# Patient Record
Sex: FEMALE | Race: OTHER | Employment: UNEMPLOYED | URBAN - METROPOLITAN AREA
[De-identification: names, ages, dates, MRNs, and addresses within clinical notes are randomized per-mention and may not be internally consistent; named-entity substitution may affect disease eponyms.]

---

## 2019-06-04 ENCOUNTER — HOSPITAL ENCOUNTER (OUTPATIENT)
Dept: PHYSICAL THERAPY | Age: 59
End: 2019-06-04

## 2023-10-18 ENCOUNTER — OFFICE VISIT (OUTPATIENT)
Dept: NEUROLOGY | Age: 63
End: 2023-10-18
Payer: COMMERCIAL

## 2023-10-18 VITALS
SYSTOLIC BLOOD PRESSURE: 144 MMHG | WEIGHT: 177 LBS | DIASTOLIC BLOOD PRESSURE: 82 MMHG | OXYGEN SATURATION: 96 % | HEART RATE: 72 BPM | BODY MASS INDEX: 32.37 KG/M2

## 2023-10-18 DIAGNOSIS — G44.86 CERVICOGENIC HEADACHE: ICD-10-CM

## 2023-10-18 DIAGNOSIS — R40.0 DAYTIME SOMNOLENCE: ICD-10-CM

## 2023-10-18 DIAGNOSIS — G47.33 OBSTRUCTIVE SLEEP APNEA: ICD-10-CM

## 2023-10-18 DIAGNOSIS — M62.89 MUSCLE STIFFNESS: ICD-10-CM

## 2023-10-18 DIAGNOSIS — G31.84 MILD COGNITIVE IMPAIRMENT WITH MEMORY LOSS: Primary | ICD-10-CM

## 2023-10-18 PROBLEM — G47.10 DAYTIME HYPERSOMNIA: Status: ACTIVE | Noted: 2017-08-15

## 2023-10-18 PROBLEM — E66.9 OBESITY WITH BODY MASS INDEX 30 OR GREATER: Status: ACTIVE | Noted: 2017-11-02

## 2023-10-18 PROBLEM — R06.83 SNORING: Status: ACTIVE | Noted: 2017-11-15

## 2023-10-18 PROBLEM — I10 ESSENTIAL HYPERTENSION: Status: ACTIVE | Noted: 2019-05-09

## 2023-10-18 PROBLEM — R55 SYNCOPE: Status: ACTIVE | Noted: 2023-08-25

## 2023-10-18 PROBLEM — I10 BENIGN ESSENTIAL HTN: Status: ACTIVE | Noted: 2020-02-19

## 2023-10-18 PROBLEM — E66.9 OBESITY (BMI 30-39.9): Status: ACTIVE | Noted: 2017-11-15
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
## 2023-10-18 PROBLEM — M54.2 NECK PAIN: Status: ACTIVE | Noted: 2017-10-29

## 2023-10-18 PROBLEM — F51.01 PRIMARY INSOMNIA: Status: ACTIVE | Noted: 2023-10-18

## 2023-10-18 PROCEDURE — 3079F DIAST BP 80-89 MM HG: CPT | Performed by: PSYCHIATRY & NEUROLOGY

## 2023-10-18 PROCEDURE — 99205 OFFICE O/P NEW HI 60 MIN: CPT | Performed by: PSYCHIATRY & NEUROLOGY

## 2023-10-18 PROCEDURE — 3077F SYST BP >= 140 MM HG: CPT | Performed by: PSYCHIATRY & NEUROLOGY

## 2023-10-18 RX ORDER — DONEPEZIL HYDROCHLORIDE 5 MG/1
5 TABLET, ORALLY DISINTEGRATING ORAL NIGHTLY
Qty: 90 TABLET | Refills: 1 | Status: SHIPPED | OUTPATIENT
Start: 2023-10-18 | End: 2024-04-15

## 2023-10-18 RX ORDER — LOSARTAN POTASSIUM 100 MG/1
100 TABLET ORAL DAILY
COMMUNITY

## 2023-10-18 RX ORDER — CELECOXIB 200 MG/1
200 CAPSULE ORAL PRN
COMMUNITY
Start: 2022-12-05

## 2023-10-18 RX ORDER — MECLIZINE HYDROCHLORIDE 25 MG/1
25 TABLET ORAL 3 TIMES DAILY PRN
COMMUNITY
Start: 2023-07-20

## 2023-10-18 RX ORDER — LIDOCAINE 50 MG/G
1 PATCH TOPICAL DAILY
COMMUNITY
Start: 2023-07-20

## 2023-10-18 RX ORDER — ZOLPIDEM TARTRATE 5 MG/1
TABLET ORAL
COMMUNITY
End: 2023-10-18

## 2023-10-18 RX ORDER — LEVOTHYROXINE SODIUM 88 UG/1
88 TABLET ORAL DAILY
COMMUNITY

## 2023-10-18 ASSESSMENT — PATIENT HEALTH QUESTIONNAIRE - PHQ9
1. LITTLE INTEREST OR PLEASURE IN DOING THINGS: 0
SUM OF ALL RESPONSES TO PHQ QUESTIONS 1-9: 0
SUM OF ALL RESPONSES TO PHQ9 QUESTIONS 1 & 2: 0
SUM OF ALL RESPONSES TO PHQ QUESTIONS 1-9: 0
SUM OF ALL RESPONSES TO PHQ QUESTIONS 1-9: 0
2. FEELING DOWN, DEPRESSED OR HOPELESS: 0
SUM OF ALL RESPONSES TO PHQ QUESTIONS 1-9: 0

## 2023-10-18 ASSESSMENT — ENCOUNTER SYMPTOMS
TROUBLE SWALLOWING: 0
ABDOMINAL PAIN: 0
SHORTNESS OF BREATH: 0
SORE THROAT: 0
COUGH: 0
NAUSEA: 0
DIARRHEA: 0
EYE PAIN: 0

## 2023-10-18 NOTE — PROGRESS NOTES
[facility] NEUROLOGY NOTE    Patient: Chanel Johnston  Physician: Ezzie Aschoff, MD    CC: referred for memory loss  PCP: SCHUYLER Santamaria NP    History of Present Illness:     Chanel Johnston is a 57 y.o. right-handed [place]er speaking female with PMH of ROSELIA, overweight, hypertension, history of syncopal events, presents for evaluation of memory difficulties and tension type headaches. An audio-visual interface with a certified  was used today for the entire visit. On 7/20/2023, patient had a fall after tripping a and hit the right side of her head in the front on the ground. She points to her periorbital/temporal region. Complains of residual tenderness around this side of her head and neck on the right side. Reports no triggers however has spontaneous tension type headaches, complains of right neck and occipital pain more than left. On exam she has tenderness and muscle stiffness in both occipital regions and both trapezius muscles are very stiff. CT head results reviewed from [facility], no evidence of acute intracranial abnormality or hemorrhage. CT cervical spine did not show fractures or evidence of cervical stenosis per report. Her most concerning complaint today is memory difficulty. 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 These are people she has met several times and does not recognize the face when they go into hug her or say hello. She endorses memory loss for several years before this recent fall. She reports having done MMSE testing in 2012, 2015 and approximately 2 months ago by doctors at [facility]. Does not recall the results. Denies significant mood changes, hallucinations, does not get lost.  Sometimes forgets where she leaves objects in the house. She tells her family members \"Don't tell me to put this away, or I will never find it again\".   Also complains of losing track of her

## 2023-10-20 ENCOUNTER — TELEPHONE (OUTPATIENT)
Dept: NEUROLOGY | Age: 63
End: 2023-10-20

## 2023-10-20 NOTE — TELEPHONE ENCOUNTER
Xuan with [facility] [place] called asking if the split sleep study for Jad Mason MRN: [id number] can be changed to an In Home study?      Thank you  Sahara Morris

## 2023-10-23 ENCOUNTER — TELEPHONE (OUTPATIENT)
Dept: NEUROLOGY | Age: 63
End: 2023-10-23

## 2023-10-23 NOTE — TELEPHONE ENCOUNTER
Ludmila Castle called stating the patient needs a peer to peer for her sleep study, need to speak with Satish Range for this. Number to contact for this is [phone].

## 2024-03-22 ENCOUNTER — OFFICE VISIT (OUTPATIENT)
Dept: ORTHOPEDIC SURGERY | Age: 64
End: 2024-03-22

## 2024-03-22 VITALS — BODY MASS INDEX: 32.57 KG/M2 | WEIGHT: 177 LBS | HEIGHT: 62 IN

## 2024-03-22 DIAGNOSIS — M51.36 DDD (DEGENERATIVE DISC DISEASE), LUMBAR: ICD-10-CM

## 2024-03-22 DIAGNOSIS — M47.816 LUMBAR SPONDYLOSIS: Primary | ICD-10-CM

## 2024-03-22 RX ORDER — OXYBUTYNIN CHLORIDE 10 MG/1
10 TABLET, EXTENDED RELEASE ORAL DAILY
COMMUNITY
Start: 2016-08-18

## 2024-03-22 RX ORDER — TIZANIDINE 2 MG/1
2 TABLET ORAL NIGHTLY PRN
Qty: 30 TABLET | Refills: 2 | Status: SHIPPED | OUTPATIENT
Start: 2024-03-22

## 2024-03-22 RX ORDER — PREDNISONE 10 MG/1
10 TABLET ORAL SEE ADMIN INSTRUCTIONS
Qty: 48 EACH | Refills: 0 | Status: SHIPPED | OUTPATIENT
Start: 2024-03-22 | End: 2024-04-03

## 2024-03-22 NOTE — PROGRESS NOTES
Headaches       No current facility-administered medications for this visit.                   Review of Systems:  As per HPI.  Pertinent positives and negatives are addressed with the patient, particularly those related to musculoskeletal concerns.   Other non-emergent concerns were referred back to the primary care physician.      PHYSICAL EXAMINATION:     The patient appears their stated age and they are in no distress.  Ht 1.575 m (5' 2\")   Wt 80.3 kg (177 lb)   BMI 32.37 kg/m²         Neuro:   Reflexes  Knees: Diminished  Ankles: Diminished        Sensory    Sensation to light touch intact L3-S1      Motor    Hip Flexion: grossly normal    Knee Extension:  grossly normal    Ankle Dorsiflexion: grossly normal    Great Toe Exension:  grossly normal       Point tenderness: Minimal    Gait: No appreciable limp               IMAGING:     3 views of the lumbar spine today reveal good alignment.  Fair lordosis.  Multilevel mild to moderate degenerative disc disease and spondylosis.          ASSESSMENT AND PLAN:     The patient has signs and symptoms consistent with lumbar radiculopathy.  I recommend a course of physical therapy, Sterapred pack, tizanidine to help the cramps.  Will plan to follow-up with her in 8 weeks for recheck of her symptoms or sooner if needed.  If she fails conservative management recommend a lumbar MRI for further evaluation.          4--this is a chronic illness/condition with exacerbation

## 2024-04-14 DIAGNOSIS — M51.36 DDD (DEGENERATIVE DISC DISEASE), LUMBAR: ICD-10-CM

## 2024-04-14 DIAGNOSIS — M47.816 LUMBAR SPONDYLOSIS: ICD-10-CM

## 2024-04-15 RX ORDER — TIZANIDINE 2 MG/1
2 TABLET ORAL NIGHTLY PRN
Qty: 90 TABLET | Refills: 1 | OUTPATIENT
Start: 2024-04-15

## 2024-05-17 ENCOUNTER — OFFICE VISIT (OUTPATIENT)
Dept: ORTHOPEDIC SURGERY | Age: 64
End: 2024-05-17
Payer: COMMERCIAL

## 2024-05-17 DIAGNOSIS — M54.16 LUMBAR RADICULOPATHY: Primary | ICD-10-CM

## 2024-05-17 PROCEDURE — 1123F ACP DISCUSS/DSCN MKR DOCD: CPT | Performed by: PHYSICIAN ASSISTANT

## 2024-05-17 PROCEDURE — 99214 OFFICE O/P EST MOD 30 MIN: CPT | Performed by: PHYSICIAN ASSISTANT

## 2024-05-17 RX ORDER — GABAPENTIN 600 MG/1
300 TABLET ORAL 3 TIMES DAILY
Qty: 45 TABLET | Refills: 2 | Status: SHIPPED | OUTPATIENT
Start: 2024-05-17 | End: 2024-08-15

## 2024-05-17 NOTE — PROGRESS NOTES
there is prominent cyst in the left foramen consistent with perineural cyst but essentially filling the foramen.    At  L1-2 no significant abnormality moderate facet hypertrophy is present.    At L2-3 disc dessication is noted.  Moderate facet hypertrophy is present.  No significant lateralized findings seen.    At L3-4 there is marked facet hypertrophy.  Circumferential disc bulge is present with mild foraminal narrowing bilaterally.    At L4-5 moderate facet hypertrophy is present.  Disc dessication is noted with a circumferential disc bulge.  Mild moderate foraminal narrowing is present, left greater than right.    At L5-S1 a small central disc protrusion is noted without significant compression.  No lateralized findings are identified.  Mild facet high is noted.    Impression  Degenerative disc and facet changes as detailed above.  Additionally there is a prominent presumed perineural cyst on the left at T12-L1.  Clinical correlation is recommended.        : kerwin  TRANSCRIBE TIME/DATE: 09/11/2018 04:57 pm  READ BY: ROBBY ALEX MD    THIS IS AN ELECTRONICALLY VERIFIED REPORT  9/11/2018 4:57 PM:  ROBBY ALEX MD    The above report was produced by using a computer based typing program that converts spoken words to text. Random mistakes in words identified by the computer are very often initially present. Despite careful review some mistakes may still be found in the final released report.    All CT scans at this facility use specific methods available to minimize radiation exposure to our patients. We use  techniques such as dose modulation, iterative reconstruction, and/or weight based dosing when appropriate to reduce radiation dose to as low as reasonably achievable.            Assessment and Plan    I am unsure why her therapy would not be approved, this is not typical.  I recommended she follow-up with her health insurance plan to inquire as to why, perhaps she needs to

## 2024-05-22 DIAGNOSIS — M51.36 DDD (DEGENERATIVE DISC DISEASE), LUMBAR: ICD-10-CM

## 2024-05-22 DIAGNOSIS — M47.816 LUMBAR SPONDYLOSIS: ICD-10-CM

## 2024-05-22 RX ORDER — TIZANIDINE 2 MG/1
2 TABLET ORAL NIGHTLY PRN
Qty: 90 TABLET | Refills: 1 | OUTPATIENT
Start: 2024-05-22

## 2024-05-30 ENCOUNTER — OFFICE VISIT (OUTPATIENT)
Dept: UROGYNECOLOGY | Age: 64
End: 2024-05-30
Payer: COMMERCIAL

## 2024-05-30 VITALS — BODY MASS INDEX: 32.74 KG/M2 | HEIGHT: 61 IN | WEIGHT: 173.4 LBS

## 2024-05-30 DIAGNOSIS — M62.89 HIGH-TONE PELVIC FLOOR DYSFUNCTION: ICD-10-CM

## 2024-05-30 DIAGNOSIS — N39.46 MIXED STRESS AND URGE URINARY INCONTINENCE: Primary | ICD-10-CM

## 2024-05-30 DIAGNOSIS — N95.2 VAGINAL ATROPHY: ICD-10-CM

## 2024-05-30 DIAGNOSIS — N39.3 SUI (STRESS URINARY INCONTINENCE, FEMALE): ICD-10-CM

## 2024-05-30 LAB
BILIRUBIN, URINE, POC: NEGATIVE
BLOOD URINE, POC: NORMAL
GLUCOSE URINE, POC: NEGATIVE
KETONES, URINE, POC: NEGATIVE
LEUKOCYTE ESTERASE, URINE, POC: NEGATIVE
NITRITE, URINE, POC: NEGATIVE
PH, URINE, POC: 6 (ref 4.6–8)
PROTEIN,URINE, POC: NEGATIVE
SPECIFIC GRAVITY, URINE, POC: 1.02 (ref 1–1.03)
URINALYSIS CLARITY, POC: CLEAR
URINALYSIS COLOR, POC: YELLOW
UROBILINOGEN, POC: NORMAL

## 2024-05-30 PROCEDURE — 99459 PELVIC EXAMINATION: CPT | Performed by: OBSTETRICS & GYNECOLOGY

## 2024-05-30 PROCEDURE — 99204 OFFICE O/P NEW MOD 45 MIN: CPT | Performed by: OBSTETRICS & GYNECOLOGY

## 2024-05-30 PROCEDURE — 51701 INSERT BLADDER CATHETER: CPT | Performed by: OBSTETRICS & GYNECOLOGY

## 2024-05-30 PROCEDURE — 81003 URINALYSIS AUTO W/O SCOPE: CPT | Performed by: OBSTETRICS & GYNECOLOGY

## 2024-05-30 RX ORDER — ESTRADIOL 0.1 MG/G
1 CREAM VAGINAL
Qty: 42.5 G | Refills: 3 | Status: SHIPPED | OUTPATIENT
Start: 2024-05-31

## 2024-05-30 NOTE — PATIENT INSTRUCTIONS
Please use the estrogen cream 1 g every night for 2 weeks and then use it 3 nights per week.     Water Based   Astroglide   Just Like Me   K-Y Jelly   Pre-Seed   Slippery Stuff   Liquid Silk     Silicone Based   Astroglide X   ID Millennium   K-Y intrigue   Pink    Moisturizers   Replens   Me Again   Vagisil   Feminease   YUDITH Davis Secret

## 2024-05-30 NOTE — ASSESSMENT & PLAN NOTE
We discussed the differential diagnosis of urinary incontinence. She is aware that women leak urine for multiple different reasons. Many women have more than one type of urinary incontinence. We also discussed the pathogenesis and etiology of stress urinary incontinence. I explained the epidemiology of incontinence. I offered her options which include nothing, dietary modifications, physical therapy, barrier treatment, in-office procedures and surgery.     My recommendations:  Please eliminate bladder irritants. This includes caffeine, artificial sweeteners, carbonated beverages, alcohol, tomato based products, citrus and spicy foods.   2 cups of coffee  I highly recommend pelvic floor physical therapy. She has very HTPFD. HIGHLY recommend PT as this is likely prohibiting her ability to utilize the muscles well to prevent leaking  She also had a surgery with Dr. Barreto in 2011, but in further discussion she reports that she told him she didn't want mesh. I do not have the operative notes to review.

## 2024-05-30 NOTE — ASSESSMENT & PLAN NOTE
We discussed starting vaginal estrogen cream for atrophy. I described the benefits to vaginal health. We briefly discussed well publicized literature on risks of hormone replacement therapy. I described the low systemic absorption of vaginal estrogen. She will start vaginal estrogen. Please use 1g in the vagina 2-3 nights per week.

## 2024-05-30 NOTE — ASSESSMENT & PLAN NOTE
She has very high tone pelvic floor. Sex is very painful for her. I was able to use a regular speculum on her. She had done dilator therapy back in 2011. We discussed the purpose of physical therapy which is to strengthen the pelvic floor muscles and teach proper coordination of those muscles. I described the anatomy of those muscles involved and their relationship to the end-organs in the pelvis. I described therapy techniques which include a combination of therapeutic exercise, biofeedback, neuromuscular re-education, home programs, and electrical stimulation, as well as therapeutic massage and ultrasound for pain.    I also encouraged her to use lots of foreplay to help relax before penetration.     I recommend Physical therapy and will send a referral.

## 2024-05-30 NOTE — PROGRESS NOTES
recommendations:  Please eliminate bladder irritants. This includes caffeine, artificial sweeteners, carbonated beverages, alcohol, tomato based products, citrus and spicy foods.   2 cups of coffee  I highly recommend pelvic floor physical therapy. She has very HTPFD. HIGHLY recommend PT as this is likely prohibiting her ability to utilize the muscles well to prevent leaking  She also had a surgery with Dr. Barreto in 2011, but in further discussion she reports that she told him she didn't want mesh. I do not have the operative notes to review.        Orders:  -     Ambulatory referral to Physical Therapy  4. Vaginal atrophy  Assessment & Plan:   We discussed starting vaginal estrogen cream for atrophy. I described the benefits to vaginal health. We briefly discussed well publicized literature on risks of hormone replacement therapy. I described the low systemic absorption of vaginal estrogen. She will start vaginal estrogen. Please use 1g in the vagina 2-3 nights per week.          Return in about 4 months (around 9/30/2024) for HTPFD/ TIFFANIE.        On this date 5/30/2024 I have spent 47 minutes reviewing previous notes, test results and face to face with the patient discussing the diagnosis and importance of compliance with the treatment plan as well as documenting on the day of the visit. This is exclusive of separately reported procedures.     Peggy Hardin DO

## 2024-06-04 ENCOUNTER — OFFICE VISIT (OUTPATIENT)
Dept: NEUROLOGY | Age: 64
End: 2024-06-04
Payer: COMMERCIAL

## 2024-06-04 VITALS — OXYGEN SATURATION: 94 % | DIASTOLIC BLOOD PRESSURE: 80 MMHG | SYSTOLIC BLOOD PRESSURE: 146 MMHG | HEART RATE: 57 BPM

## 2024-06-04 DIAGNOSIS — R25.2 CRAMPS OF LEFT LOWER EXTREMITY: ICD-10-CM

## 2024-06-04 DIAGNOSIS — M54.50 CHRONIC BILATERAL LOW BACK PAIN, UNSPECIFIED WHETHER SCIATICA PRESENT: ICD-10-CM

## 2024-06-04 DIAGNOSIS — R20.0 NUMBNESS OF LEFT ANTERIOR THIGH: ICD-10-CM

## 2024-06-04 DIAGNOSIS — G31.84 MILD COGNITIVE IMPAIRMENT WITH MEMORY LOSS: Primary | ICD-10-CM

## 2024-06-04 DIAGNOSIS — G89.29 CHRONIC BILATERAL LOW BACK PAIN, UNSPECIFIED WHETHER SCIATICA PRESENT: ICD-10-CM

## 2024-06-04 PROCEDURE — 3077F SYST BP >= 140 MM HG: CPT | Performed by: PSYCHIATRY & NEUROLOGY

## 2024-06-04 PROCEDURE — 1123F ACP DISCUSS/DSCN MKR DOCD: CPT | Performed by: PSYCHIATRY & NEUROLOGY

## 2024-06-04 PROCEDURE — 3079F DIAST BP 80-89 MM HG: CPT | Performed by: PSYCHIATRY & NEUROLOGY

## 2024-06-04 PROCEDURE — 99215 OFFICE O/P EST HI 40 MIN: CPT | Performed by: PSYCHIATRY & NEUROLOGY

## 2024-06-04 RX ORDER — ATORVASTATIN CALCIUM 20 MG/1
20 TABLET, FILM COATED ORAL DAILY
COMMUNITY

## 2024-06-04 ASSESSMENT — ENCOUNTER SYMPTOMS
COUGH: 0
EYE PAIN: 0
SORE THROAT: 0
SHORTNESS OF BREATH: 0
TROUBLE SWALLOWING: 0
ABDOMINAL PAIN: 0

## 2024-06-04 NOTE — PROGRESS NOTES
[facility] NEUROLOGY FOLLOW-UP NOTE    Patient: Mildred Cervantes  Physician: Damien Collazo MD    CC:   Chief Complaint   Patient presents with    Follow-up     F/U on memory loss        PCP: Esteban Herbert APRN - NP  Referring Provider: No ref. provider found    History of Present Illness:     Interval History on 6/4/2024:  Mildred Cervantes is a 63 y.o. female who presents for follow-up management of headaches and memory changes. An audio-visual interface with a certified  was used today for the entire visit.    Reports significant improvement in the memory and cognitive function, she reported being depressed and not doing well at last visit.  She is currently studying for citizenship exam and mostly able to remember items with some occasional forgetfulness.   She also no longer has headaches and denies having neck pain.    Patient complains of some bilateral knee pain, osteoarthritic and worsened by walking.  She is following with orthopedic PA, no longer taking gabapentin, but reportedly taking celecoxib.  She also takes tizanidine for some left thigh muscle spasms, given by Ortho.  Since 1/2024, feels numbness on outside of the left lateral thigh area, she will be followed into some PT and conservative management before Ortho decides to pursue MRI L-spine.  She does not wear any tight belts and has not gained significant weight, thus low suspicion for meralgia paresthetica.      Original HPI:  Mildred Cervantes is a 62 y.o. right-handed [place] speaking female with PMH of ROSELIA, overweight, hypertension, history of syncopal events, presents for evaluation of memory difficulties and tension type headaches.  An audio-visual interface with a certified  was used today for the entire visit.    On 7/20/2023, patient had a fall after tripping a and hit the right side of her head in the front on the ground.  She points to her periorbital/temporal region. Complains of residual tenderness around

## 2024-06-20 DIAGNOSIS — M47.816 LUMBAR SPONDYLOSIS: ICD-10-CM

## 2024-06-20 DIAGNOSIS — M51.36 DDD (DEGENERATIVE DISC DISEASE), LUMBAR: ICD-10-CM

## 2024-06-20 RX ORDER — TIZANIDINE 2 MG/1
2 TABLET ORAL NIGHTLY PRN
Qty: 30 TABLET | Refills: 2 | OUTPATIENT
Start: 2024-06-20